# Patient Record
(demographics unavailable — no encounter records)

---

## 2019-01-01 NOTE — DISCHARGE SUMMARY
Physician: Ochoa Bonilla MD

DATE OF ADMISSION: 2019

DATE OF DISCHARGE:  2019

 

DISCHARGE DIAGNOSIS:  Physiologic jaundice.

 

FOLLOWUP:  [facility] [place] on Friday, [facility].

 

NARRATIVE SUMMARY:  Birth weight is 3541 grams, and discharge weight is 3423 
grams, and that is a 3% loss.  Baby has had excellent output of urine and 
meconium stools. 

 

Baby was admitted for a bilirubin of 18.6 with direct bilirubin of 1.1.  
Phototherapy was instituted and, after 24 hours, the bilirubin is now at 14.7 
and the direct is 0.4.  Baby does not have any other risk factors for hyperbili,
except for a 36 weeks gestation age.

 

PHYSICAL EXAMINATION  

GENERAL:  Exam shows a vigorous baby. 

HEAD/NECK:  Normal cranial exam normal fontanelle, and the neck is supple with 
full range of motion.  Eyes show normal red reflex, conjugate gaze.  ENT is 
normal.  Suck and swallow coordinated.

CLAVICLES:  Intact.

CHEST WALL, BACK, AND BREASTS:  Normal.

LUNGS:  Clear, equal breath sounds.

CARDIAC:  Exam shows regular rate and rhythm without murmur.

EXTREMITIES:  Normal hip exam.

NEUROLOGIC:  Normal neuro exam, normal infantile reflexes.

 

Mom is nursing the baby well and feeling comfortable with the process.  This is 
her fourth child.  She has plenty of milk supply.

 

The plan is to keep the baby on phototherapy over the course of the day, and we 
will recheck a bilirubin at 4:00 this afternoon and plan to discharge the baby 
at 5:00 this afternoon.

 

Previously a murmur was heard.  I do not hear that today.  There is no sign of 
liver enlargement and baby has normal perfusion and pulses, and has passed a 
 hearing screen and  cardiac screen.

 

ASSESSMENT:  Physiologic jaundice responsive to phototherapy. 



addendum 19  18:25        Total bili 14.5 , no change.  OK for discharge. 
Discussed low risk of signif rebound, except in case of breast milk jaundice, 
which may recede slowly. 

 

PLAN:  Discharge home and routine followup as outpatient.

 

 

DD: 2019 08:19

TD: 2019 08:27

Job #: [id number]

*********************** 

revised 19 per coding,jll

account/transcr. correction

orig. signed 19@1827

***********************

MTDD

## 2019-01-01 NOTE — HISTORY & PHYSICAL EXAMINATION
DATE OF SERVICE: 2019

Physician: Coleman Mccray MD

 

HISTORY OF PRESENT ILLNESS:  The patient is a currently 3423 gram 5-day-old infant girl with hyperbil
irubinemia.  The patient was born on 2019 at [facility] and was at 36-2/7 weeks gestational
 age.  Mom had a history of 3 previous  deliveries, so she had a cerclage in place for this on
e.  It was removed 6 days prior to delivery.  This mom also had beclomethasone given at 32 weeks and 
on the same day as the delivery.  Gestational diabetes mellitus was diagnosed a few days before anthony allison and mom was placed on metformin.  The baby did well and was discharged at day of life 2-3.  The t
ranscutaneous bilirubin at 36 hours was 9.1 and the weight was down 5%.  At followup on 2019, t
he patient was still down 5% with a bilirubin of 17.4.  When she returned today, her weight had impro
lashonda.  Bilirubin was 18.6, so we have admitted her for phototherapy.  Mom's milk is in and the baby is
 eating well, and making plenty of wet diapers and poopy diapers.

 

PAST MEDICAL HISTORY:  As above.

 

SOCIAL HISTORY:  The baby will live with mom, dad and siblings.  Mom plans to breastfeed.

 

PHYSICAL EXAMINATION

VITAL SIGNS:  The baby's temperature was 37.1, heart rate 122, respiratory rate 40. 

GENERAL:  The baby is asleep in incubator, in no acute distress. 

HEENT:  The anterior fontanelle is open and flat.  The palate is intact.  Clavicles intact.

LUNGS:  The baby is clear to auscultation bilaterally.

CARDIAC:  Has a regular rate and rhythm with 2/6 murmur systolic ejection murmur, best heard at the l
eft costal border.

ABDOMEN:  Soft, nontender.  Bowel sounds positive.

GENITOURINARY:  Normal female.

EXTREMITIES:  She has 2+ femoral pulses and good perfusion throughout, 2+ DTRs.  No hip instability. 


NEUROLOGIC:  Plus cry, plus Salomón, plus grasp.

 

ASSESSMENT AND PLAN:  So we have a late   female who is going to receive phototherapy. 
 We will repeat a bilirubin in the a.m.  Normal  care and breastfeeding support.

 

 

DD: 2019 16:49

TD: 2019 16:56

Job #: [id number]

## 2019-01-01 NOTE — DISCHARGE SUMMARY
Hospital Course


This is a baby girl (still not named) born to a 29 year old mother who is a 

 4 now Para 4 at 36.2 weeks Estimated Gestational Age at 21:16 via 

Spontaneous vaginal delivery.


Pediatrics was in attendance.


Resuscitation was not indicated.


Membranes ruptured 1.25 hours prior to delivery and the fluid was clear.





Baby did well during hospital stay. BG's for GDM/LGA/Late prematurity were 

normal x 24H.  First stool at 36HOL. 


Method of feeding: breast, feeding well (stopped nursing her older child just 

few months ago)


Mother's milk in: no


Stools have transitioned: no





Concerns at discharge are none











Physical Exam





- Findings


Vital Signs: 


Vital Signs











  Temp Pulse Resp


 


 19 08:00  37.2 C  136  48


 


 19 05:02   124  60


 


 19 23:46  37.1 C  142  36











Weight and Screens: 


Current weight 3.358 kg, which is down 5% Loss percent of birth weight. 

Birthweight was 3541g.  


Baby is LGA


Voiding: yes


Stooling: first small stool at 36HOL





Hearing Screen: Right ear Pass, Left ear Pass





Critical Congenital Heart Disease Screen: pending





[place] Screening: pending





Received hep B vaccine








- HEENT


Head: positive: Other (normal)


Fontanelles: positive: Flat, Soft


Ears: positive: Present bilaterally


Eyes: positive: Red reflexes bilaterally


Nares: positive: Patent


Oropharynx: positive: Clear, Strong suck, Intact palate


Neck: positive: Supple


Clavicles: positive: Intact





- Respiratory


Lungs: positive: Clear to auscultation bilaterally





- Cardiovascular


Cardiovascular: positive: Regular rate and rhythm, Capillary refill <2 sec, 2+ 

Femoral pulses.  negative: Murmur





- Gastrointestinal


Abdomen: positive: Soft.  negative: Distended, Masses, Hepatosplenomegaly


Anus: positive: Patent





- Genitourinary


Genitourinary: positive: Normal female genitalia





- Extremities


Hips: positive: Negative Ortolani, Negative Alexander


Extremeties: positive: Symmetrical motion





- Spine


Spine: positive: Midline





- Neurologic


Neurologic: positive: Normal tone, Symmetrical Salomón reflexes, Symmetrical 

Babinski reflexes, Good rooting, Bonding normally





- Skin


Skin: positive: Clear





Results





- Results


Results: 


                               Lab Results x24hrs











  19 Range/Units





  03:00 


 


[place] Metabolic Scrn  Y  








 TcB at 36HOL was 9.1, high interm risk zone





Assessment


Discharge Assessment: 


This is Day of Life #2 for this late  baby girl born via Spontaneous 

vaginal delivery at 21:16 and is ready for discharge.


* breastfeeding well


* Just had first stool at 36HOL but no concerning signs/sx














Discharge Plan


Routine  and couplet care with lactation support.


Pediatric outpatient follow up with [facility] in 2 days, with TcB.


F/u [facility] 3-4 days (sibs see Dr Mccray)

## 2019-01-01 NOTE — HISTORY & PHYSICAL EXAMINATION
[place] History and Physical





- History of Present Illness


Maternal History: 


This is a baby girl born to a 29 year-old mother who is a  4 now Para 4 

at 36 and 2/7 weeks Estimated Gestational Age via . Mother received  prenatal

care at  [facility].





Prenatal labs:


GBS:  negative


RPR: non-reactive


Rubella:  Immune


HBsAg: nonreactive


Hepatitis C Ab: neg


HIV: negative


GC/chlamydia: negative


Blood type: B POS


Antibody: neg





Pregnancy complications:   Cerclage at  13 weeks EGA by   secondary to 

previous premature deliveries at 23wk (w subsequent  demise),  30 wk and

33 weeks EGA.  Betamethasone was given at 32weeks EGA and again today.  Cerclage

was removed 6 days ago.  Has been taking 17OHP.


         GDM diagnosed .  Metformin started 6 days ago


         UTI at 18 weeks EGA, treated











- Birth


Labor and [place] Delivery: 





Labor: Spontaneous and premature. AROM about 90 min prior to delivery, clear


Peds called to attend delivery





Delivery:  . Baby cried spontaneously. No resuscitation indicated.








Family/Social History





- Family History


Discussion: 


PMHx maternal:    prior premature deliveries, s/p RIH repair


FHx:  maternal uncle w Type 1 DM











- Social History


Discussion: 


SocHx:  ;  mom  current smoker3-4 cigarettes/day, nondrinker; 2 sibs


Peds- Dr Mahendra RED








Physical Exam





- Physical Exam


Vital Signs and Measurements: 


Birthweight is pending


Length - pending


Head circumference - pending


Appears  LGA but weight pending





Gestational Age: Large for Gestational Age





- HEENT


Head: positive: Normal molding


Fontanelles: positive: Flat, Soft


Ears: positive: Present bilaterally


Eyes: positive: Other (present bilaterally; rr not assessed)


Nares: positive: Patent


Oropharynx: positive: Clear, Strong suck, Intact palate


Neck: positive: Supple


Clavicles: positive: Intact





- Respiratory


Lungs: positive: Clear to auscultation bilaterally





- Cardiovascular


Cardiovascular: positive: Regular rate and rhythm, Capillary refill <2 sec, 2+ 

Femoral pulses





- Gastrointestinal


Abdomen: positive: Soft


Anus: positive: Patent





- Genitourinary


Genitourinary: positive: Normal female genitalia





- Extremities


Hips: positive: Negative Ortolani, Negative Alexander


Extremeties: positive: Symmetrical motion





- Spine


Spine: positive: Midline





- Neurologic


Neurologic: positive: Normal tone, Symmetrical Olar reflexes, Symmetrical 

Babinski reflexes, Good rooting, Bonding normally





- Skin


Skin: positive: Clear





Results





- Results


Results: 


AC dex pending





Impression





- Impression


Assessment/Impression: 


This is Day of Life #1 for this late  baby girl born via   at 2108 

tonight and transitioning beautifully.


At risk for dex instability due to prematurity, possibly LGA, and maternal GDM 

on metformin for 6 days








Plan





- Plan


I expect patient to be DC'd or transferred within 96 hours.: Yes


Plan: 


Routine  and couplet care with lactation support. 


Hypoglycemia protocol


Peds outpatient follow up with Dr Mahendra Hu PCP.

## 2019-01-01 NOTE — PROVIDER PROGRESS NOTE
Objective





- Genitourinary


Genitourinary: positive: Normal female genitalia





Results





- Results


Results: 


19 bili was 17.4





Assessment


On 19 Baby had  jaundice P59.9 and a bili was drawn as an 

outpatient.